# Patient Record
Sex: MALE | Race: OTHER | NOT HISPANIC OR LATINO | URBAN - METROPOLITAN AREA
[De-identification: names, ages, dates, MRNs, and addresses within clinical notes are randomized per-mention and may not be internally consistent; named-entity substitution may affect disease eponyms.]

---

## 2018-12-21 ENCOUNTER — EMERGENCY (EMERGENCY)
Facility: HOSPITAL | Age: 28
LOS: 1 days | Discharge: HOME | End: 2018-12-21
Attending: EMERGENCY MEDICINE
Payer: SELF-PAY

## 2018-12-21 VITALS
RESPIRATION RATE: 16 BRPM | DIASTOLIC BLOOD PRESSURE: 86 MMHG | WEIGHT: 169.98 LBS | HEART RATE: 93 BPM | OXYGEN SATURATION: 98 % | TEMPERATURE: 98 F | HEIGHT: 70 IN | SYSTOLIC BLOOD PRESSURE: 132 MMHG

## 2018-12-21 VITALS
RESPIRATION RATE: 18 BRPM | TEMPERATURE: 98 F | HEART RATE: 84 BPM | OXYGEN SATURATION: 98 % | DIASTOLIC BLOOD PRESSURE: 71 MMHG | SYSTOLIC BLOOD PRESSURE: 121 MMHG

## 2018-12-21 PROCEDURE — 82962 GLUCOSE BLOOD TEST: CPT

## 2018-12-21 PROCEDURE — 99284 EMERGENCY DEPT VISIT MOD MDM: CPT | Mod: 25

## 2018-12-21 PROCEDURE — 99285 EMERGENCY DEPT VISIT HI MDM: CPT

## 2018-12-21 PROCEDURE — 99053 MED SERV 10PM-8AM 24 HR FAC: CPT

## 2018-12-21 NOTE — ED ADULT NURSE NOTE - OBJECTIVE STATEMENT
BIBA, on arrival, pt observed lethargic, denies being in any distress/discomfort. Pt assisted with removal of his properties and changed to yellow gown.

## 2018-12-21 NOTE — ED PROVIDER NOTE - OBJECTIVE STATEMENT
28 year old male denies PMH cming in with etoh intoxication. denies all other complaints at this time.

## 2018-12-21 NOTE — ED ADULT TRIAGE NOTE - CHIEF COMPLAINT QUOTE
He was found in the street, lethargic with slurred speech, unsteady gait.  Patient states he drank 3 bottles of Guinness stout.

## 2018-12-21 NOTE — ED ADULT NURSE NOTE - NSIMPLEMENTINTERV_GEN_ALL_ED
Implemented All Fall Risk Interventions:  [place] to call system. Call bell, personal items and telephone within reach. Instruct patient to call for assistance. Room bathroom lighting operational. Non-slip footwear when patient is off stretcher. Physically safe environment: no spills, clutter or unnecessary equipment. Stretcher in lowest position, wheels locked, appropriate side rails in place. Provide visual cue, wrist band, yellow gown, etc. Monitor gait and stability. Monitor for mental status changes and reorient to person, place, and time. Review medications for side effects contributing to fall risk. Reinforce activity limits and safety measures with patient and family.

## 2021-11-17 ENCOUNTER — EMERGENCY (EMERGENCY)
Facility: HOSPITAL | Age: 31
LOS: 1 days | Discharge: HOME | End: 2021-11-17
Attending: EMERGENCY MEDICINE | Admitting: EMERGENCY MEDICINE
Payer: MEDICAID

## 2021-11-17 VITALS
HEART RATE: 66 BPM | RESPIRATION RATE: 18 BRPM | OXYGEN SATURATION: 100 % | DIASTOLIC BLOOD PRESSURE: 86 MMHG | WEIGHT: 169.98 LBS | HEIGHT: 69 IN | TEMPERATURE: 98 F | SYSTOLIC BLOOD PRESSURE: 135 MMHG

## 2021-11-17 DIAGNOSIS — R07.9 CHEST PAIN, UNSPECIFIED: ICD-10-CM

## 2021-11-17 DIAGNOSIS — U07.1 COVID-19: ICD-10-CM

## 2021-11-17 LAB — SARS-COV-2 RNA SPEC QL NAA+PROBE: DETECTED

## 2021-11-17 PROCEDURE — 99284 EMERGENCY DEPT VISIT MOD MDM: CPT

## 2021-11-17 PROCEDURE — 93010 ELECTROCARDIOGRAM REPORT: CPT

## 2021-11-17 PROCEDURE — 71045 X-RAY EXAM CHEST 1 VIEW: CPT | Mod: 26

## 2021-11-17 RX ORDER — ACETAMINOPHEN 500 MG
975 TABLET ORAL ONCE
Refills: 0 | Status: COMPLETED | OUTPATIENT
Start: 2021-11-17 | End: 2021-11-17

## 2021-11-17 RX ORDER — CHOLECALCIFEROL (VITAMIN D3) 125 MCG
1 CAPSULE ORAL
Qty: 30 | Refills: 0
Start: 2021-11-17 | End: 2021-12-16

## 2021-11-17 RX ORDER — IBUPROFEN 200 MG
800 TABLET ORAL ONCE
Refills: 0 | Status: COMPLETED | OUTPATIENT
Start: 2021-11-17 | End: 2021-11-17

## 2021-11-17 RX ORDER — IBUPROFEN 200 MG
1 TABLET ORAL
Qty: 21 | Refills: 0
Start: 2021-11-17 | End: 2021-11-23

## 2021-11-17 RX ORDER — ASCORBIC ACID 60 MG
2 TABLET,CHEWABLE ORAL
Qty: 20 | Refills: 0
Start: 2021-11-17 | End: 2021-11-26

## 2021-11-17 RX ORDER — ZINC SULFATE TAB 220 MG (50 MG ZINC EQUIVALENT) 220 (50 ZN) MG
1 TAB ORAL
Qty: 30 | Refills: 0
Start: 2021-11-17 | End: 2021-12-16

## 2021-11-17 NOTE — ED PROVIDER NOTE - PATIENT PORTAL LINK FT
You can access the FollowMyHealth Patient Portal offered by [facility] by registering at the following website: [email or web address]. By joining [facility]’s FollowMyHealth portal, you will also be able to view your health information using other applications (apps) compatible with our system.

## 2021-11-17 NOTE — ED ADULT NURSE NOTE - OBJECTIVE STATEMENT
Pt came in c/o of cp radiating to left arm x 1 month. Pt reports testing positive for covid + today at . PT resting comfortably in bed. NAD. Vitals wnl. Denies fever, chills, sob, n/v/d. A&Ox3 speaking in full sentences Pt came in c/o of cp radiating to left arm x 1 month. Pt reports testing positive for covid + today at . Unvaccinated against covid. Was sent in for EKG. PT resting comfortably in bed. NAD. Vitals wnl. Denies fever, chills, sob, n/v/d. A&Ox3 speaking in full sentences

## 2021-11-17 NOTE — ED PROVIDER NOTE - OBJECTIVE STATEMENT
30 y/o male with no PMHx presents to the ED complaining of chest pain. Patient states he first went to [facility] complaining of left sided chest pain for the past 1 month which is at times worse with movement and not worse with deep breathing, with no cough, fever or chills. Patient describes the pain as sharp and radiating to the left arm with episodes of numbness in the left arm but none now. At [facility] patient received a COVID-19 swab which came back positive and was sent to the ED for further evaluation. Patient has a FMHx of CAD and diabetes, and has not been vaccinated against COVID-19.

## 2021-11-17 NOTE — ED ADULT TRIAGE NOTE - CHIEF COMPLAINT QUOTE
Pt complaining of chest pain and left arm tightness x 1 month. Pt given 325mg of aspirin prior to arrival. PT sent in by [facility] for further eval. PT tested positive for COVID 19 today.

## 2021-11-17 NOTE — ED PROVIDER NOTE - NSFOLLOWUPINSTRUCTIONS_ED_ALL_ED_FT
You may have Coronavirus, or any of the other many colds that are going around now.     COVID-19 testing are currently being prioritized at [facility] for admitted patients.     All patients that are stable are being discharged from the ED, even if there is a concern for coronavirus. Since you are stable, you are being discharged. Your test results may take 5-7 days. You will get a phone call for postive results. Not for negative results.  Please check the patient online portal for results. Please follow the instructions on provided coronavirus discharge educational forms and self quarantine for 14 days.     In addition, you have been placed on our surveillance tracker. Return to the ED immediately if you have shortness of breath, fever, pain, weakness, vomiting any concerns.    1. STAY HOME for 14 DAYS  2. Minimize Human contact to ONLY ESSENTIAL  3. Every time you wash your hands, sing the HAPPY BIRTHDAY Song so you know you're washing long enough.  Make sure to scrub the webspace between your fingers.  4. DRINK 1-3 Liters of fluids day x at least 5 days.  To remain hydrated. Your fatigue, lightheadedness, and body aches will decrease and your fever has a better chance of breaking if you are well hydrated.    5. For your Fever and Body aches takes Tylenol 650-100mg every 4-6h (max 4000mg/day). Try not to use ibuprofen, aspirin or naproxen (Advil, Motrin or Aleve) as these may worsen Coronavirus infection.  6. Use an inhaler for mild shortness of breath and cough  7. RETURN TO THE ER IMMEDIATELY IF YOU HAVE WORSENING SHORTNESS OF BREATH  8. TAKE THE FOLLOWING SUPPLEMENTS DAILY.        VITAMIN C 1000MG ONCE DAILY.        VITAMIN D 200IU ONCE DAILY.        ZINC 50MG ONCE DAILY.

## 2021-11-17 NOTE — ED PROVIDER NOTE - CARE PROVIDER_API CALL
Erik Mederos (DO)  [address], [facility] [place] [address]  Phone: [phone]  Fax: [phone]  Follow Up Time:

## 2021-11-17 NOTE — ED PROVIDER NOTE - CLINICAL SUMMARY MEDICAL DECISION MAKING FREE TEXT BOX
Patient presents to the ED complaining of left sided chest pain for the past month. Patient with positive COVID-19 test from [facility] prior to arrival. Will repeat COVID-19 swab, get Chest X-ray and give Tylenol and Motrin and re-evaluate. Patient presents to the ED complaining of left sided chest pain for the past month. Patient with positive COVID-19 test from [facility] prior to arrival. Will repeat COVID-19 swab, get Chest X-ray and give Tylenol and Motrin and re-evaluate.    pt refused meds, CXR negative COVID (+), Spo2 Ok, stable for dc home

## 2023-07-18 NOTE — ED ADULT NURSE NOTE - NSFALLRSKOUTCOME_ED_ALL_ED
Photo Preface (Leave Blank If You Do Not Want): Photographs were obtained today
Detail Level: Zone
Universal Safety Interventions